# Patient Record
Sex: MALE | Race: BLACK OR AFRICAN AMERICAN | NOT HISPANIC OR LATINO | Employment: UNEMPLOYED | URBAN - METROPOLITAN AREA
[De-identification: names, ages, dates, MRNs, and addresses within clinical notes are randomized per-mention and may not be internally consistent; named-entity substitution may affect disease eponyms.]

---

## 2017-11-02 ENCOUNTER — HOSPITAL ENCOUNTER (OUTPATIENT)
Dept: PULMONOLOGY | Facility: HOSPITAL | Age: 27
Discharge: HOME | End: 2017-11-02
Attending: NURSE PRACTITIONER
Payer: MEDICAID

## 2017-11-02 DIAGNOSIS — Z79.899 HIGH RISK MEDICATION USE: ICD-10-CM

## 2017-11-02 DIAGNOSIS — Z79.899 HIGH RISK MEDICATION USE: Primary | ICD-10-CM

## 2017-11-02 PROCEDURE — 99900035 HC TECH TIME PER 15 MIN (STAT)

## 2017-11-02 PROCEDURE — 93005 ELECTROCARDIOGRAM TRACING: CPT

## 2017-11-02 PROCEDURE — 93010 ELECTROCARDIOGRAM REPORT: CPT | Mod: ,,, | Performed by: INTERNAL MEDICINE

## 2023-04-28 ENCOUNTER — HOSPITAL ENCOUNTER (EMERGENCY)
Facility: HOSPITAL | Age: 33
Discharge: PSYCHIATRIC OR SUBSTANCE USE FACILITY | End: 2023-04-28
Attending: EMERGENCY MEDICINE
Payer: MEDICAID

## 2023-04-28 VITALS
RESPIRATION RATE: 16 BRPM | DIASTOLIC BLOOD PRESSURE: 96 MMHG | TEMPERATURE: 98 F | WEIGHT: 210 LBS | BODY MASS INDEX: 27.71 KG/M2 | OXYGEN SATURATION: 99 % | SYSTOLIC BLOOD PRESSURE: 151 MMHG | HEART RATE: 94 BPM

## 2023-04-28 DIAGNOSIS — Z00.8 MEDICAL CLEARANCE FOR PSYCHIATRIC ADMISSION: ICD-10-CM

## 2023-04-28 DIAGNOSIS — E86.0 DEHYDRATION: ICD-10-CM

## 2023-04-28 DIAGNOSIS — F30.9 MANIA: Primary | ICD-10-CM

## 2023-04-28 LAB
ALBUMIN SERPL BCP-MCNC: 4.3 G/DL (ref 3.5–5.2)
ALP SERPL-CCNC: 40 U/L (ref 55–135)
ALT SERPL W/O P-5'-P-CCNC: 53 U/L (ref 10–44)
AMPHET+METHAMPHET UR QL: NEGATIVE
ANION GAP SERPL CALC-SCNC: 12 MMOL/L (ref 8–16)
AST SERPL-CCNC: 42 U/L (ref 10–40)
BARBITURATES UR QL SCN>200 NG/ML: NEGATIVE
BASOPHILS # BLD AUTO: 0.09 K/UL (ref 0–0.2)
BASOPHILS NFR BLD: 0.6 % (ref 0–1.9)
BENZODIAZ UR QL SCN>200 NG/ML: NEGATIVE
BILIRUB SERPL-MCNC: 1 MG/DL (ref 0.1–1)
BILIRUB UR QL STRIP: NEGATIVE
BUN SERPL-MCNC: 20 MG/DL (ref 6–20)
BZE UR QL SCN: NEGATIVE
CALCIUM SERPL-MCNC: 9.3 MG/DL (ref 8.7–10.5)
CANNABINOIDS UR QL SCN: NEGATIVE
CHLORIDE SERPL-SCNC: 110 MMOL/L (ref 95–110)
CK SERPL-CCNC: 162 U/L (ref 20–200)
CLARITY UR: CLEAR
CO2 SERPL-SCNC: 19 MMOL/L (ref 23–29)
COLOR UR: YELLOW
CREAT SERPL-MCNC: 1.5 MG/DL (ref 0.5–1.4)
CREAT UR-MCNC: 162 MG/DL (ref 23–375)
DIFFERENTIAL METHOD: ABNORMAL
EOSINOPHIL # BLD AUTO: 0 K/UL (ref 0–0.5)
EOSINOPHIL NFR BLD: 0.1 % (ref 0–8)
ERYTHROCYTE [DISTWIDTH] IN BLOOD BY AUTOMATED COUNT: 13.3 % (ref 11.5–14.5)
EST. GFR  (NO RACE VARIABLE): >60 ML/MIN/1.73 M^2
ETHANOL SERPL-MCNC: <10 MG/DL
GLUCOSE SERPL-MCNC: 101 MG/DL (ref 70–110)
GLUCOSE UR QL STRIP: NEGATIVE
HCT VFR BLD AUTO: 48.2 % (ref 40–54)
HGB BLD-MCNC: 16.3 G/DL (ref 14–18)
HGB UR QL STRIP: NEGATIVE
IMM GRANULOCYTES # BLD AUTO: 0.11 K/UL (ref 0–0.04)
IMM GRANULOCYTES NFR BLD AUTO: 0.8 % (ref 0–0.5)
KETONES UR QL STRIP: NEGATIVE
LEUKOCYTE ESTERASE UR QL STRIP: NEGATIVE
LYMPHOCYTES # BLD AUTO: 3 K/UL (ref 1–4.8)
LYMPHOCYTES NFR BLD: 20.6 % (ref 18–48)
MCH RBC QN AUTO: 28.3 PG (ref 27–31)
MCHC RBC AUTO-ENTMCNC: 33.8 G/DL (ref 32–36)
MCV RBC AUTO: 84 FL (ref 82–98)
METHADONE UR QL SCN>300 NG/ML: NEGATIVE
MONOCYTES # BLD AUTO: 2.1 K/UL (ref 0.3–1)
MONOCYTES NFR BLD: 14.6 % (ref 4–15)
NEUTROPHILS # BLD AUTO: 9.1 K/UL (ref 1.8–7.7)
NEUTROPHILS NFR BLD: 63.3 % (ref 38–73)
NITRITE UR QL STRIP: NEGATIVE
NRBC BLD-RTO: 0 /100 WBC
OPIATES UR QL SCN: NEGATIVE
PCP UR QL SCN>25 NG/ML: NEGATIVE
PH UR STRIP: 7 [PH] (ref 5–8)
PLATELET # BLD AUTO: 204 K/UL (ref 150–450)
PMV BLD AUTO: 10.2 FL (ref 9.2–12.9)
POTASSIUM SERPL-SCNC: 4 MMOL/L (ref 3.5–5.1)
PROT SERPL-MCNC: 8 G/DL (ref 6–8.4)
PROT UR QL STRIP: NEGATIVE
RBC # BLD AUTO: 5.75 M/UL (ref 4.6–6.2)
SARS-COV-2 RDRP RESP QL NAA+PROBE: NEGATIVE
SODIUM SERPL-SCNC: 141 MMOL/L (ref 136–145)
SP GR UR STRIP: 1.02 (ref 1–1.03)
TOXICOLOGY INFORMATION: NORMAL
TSH SERPL DL<=0.005 MIU/L-ACNC: 1.21 UIU/ML (ref 0.4–4)
URN SPEC COLLECT METH UR: ABNORMAL
UROBILINOGEN UR STRIP-ACNC: ABNORMAL EU/DL
WBC # BLD AUTO: 14.32 K/UL (ref 3.9–12.7)

## 2023-04-28 PROCEDURE — 99285 EMERGENCY DEPT VISIT HI MDM: CPT | Mod: 25

## 2023-04-28 PROCEDURE — 80307 DRUG TEST PRSMV CHEM ANLYZR: CPT | Performed by: EMERGENCY MEDICINE

## 2023-04-28 PROCEDURE — 96360 HYDRATION IV INFUSION INIT: CPT

## 2023-04-28 PROCEDURE — 80053 COMPREHEN METABOLIC PANEL: CPT | Performed by: EMERGENCY MEDICINE

## 2023-04-28 PROCEDURE — 84443 ASSAY THYROID STIM HORMONE: CPT | Performed by: EMERGENCY MEDICINE

## 2023-04-28 PROCEDURE — U0002 COVID-19 LAB TEST NON-CDC: HCPCS | Performed by: EMERGENCY MEDICINE

## 2023-04-28 PROCEDURE — 85025 COMPLETE CBC W/AUTO DIFF WBC: CPT | Performed by: EMERGENCY MEDICINE

## 2023-04-28 PROCEDURE — 25000003 PHARM REV CODE 250: Performed by: EMERGENCY MEDICINE

## 2023-04-28 PROCEDURE — 82077 ASSAY SPEC XCP UR&BREATH IA: CPT | Performed by: EMERGENCY MEDICINE

## 2023-04-28 PROCEDURE — 82550 ASSAY OF CK (CPK): CPT | Performed by: EMERGENCY MEDICINE

## 2023-04-28 PROCEDURE — 81003 URINALYSIS AUTO W/O SCOPE: CPT | Mod: 59 | Performed by: EMERGENCY MEDICINE

## 2023-04-28 RX ORDER — LORAZEPAM 1 MG/1
2 TABLET ORAL
Status: COMPLETED | OUTPATIENT
Start: 2023-04-28 | End: 2023-04-28

## 2023-04-28 RX ADMIN — SODIUM CHLORIDE 1000 ML: 9 INJECTION, SOLUTION INTRAVENOUS at 05:04

## 2023-04-28 RX ADMIN — LORAZEPAM 2 MG: 1 TABLET ORAL at 05:04

## 2023-04-28 NOTE — ED NOTES
Pt had grey sweat pants, grey sweat shirt, blue shoes and home medications placed in belongings bag, labeled and placed in metal locker.

## 2023-04-28 NOTE — ED PROVIDER NOTES
"SCRIBE #1 NOTE: I, Silvestre Westfall, am scribing for, and in the presence of, Ramon Vail Jr., MD. I have scribed the entire note.       History     Chief Complaint   Patient presents with    Dehydration     Brought in per EMS. Pt was found sitting on bench and was reported to have been sitting there in the sun all day.     Psychiatric Evaluation     Pt brought in per EMS after found sitting on bench all day. EMS reports pt has psych hx of paranoia. Pt denies SI/HI at present. Pt has flight of ideas during triage. Pt also states he was picked up by people in a van yesterday and brought from [facility] and brought to [place].      Review of patient's allergies indicates:  No Known Allergies      History of Present Illness     HPI    4/28/2023, 4:18 PM  History obtained from the patient and EMS      History of Present Illness: Jaqueline Valencia Jr. is a 33 y.o. male patient with a PMHx of paranoia who presents to the Emergency Department for psychiatric evaluation. Per EMS, pt reported to be found outside on a bench in the sun all day. Pt was unresponsive and unable to answer questions. Pt said they recently came from a hospital in [place] but was driven down by the hospital back to . Pt said they have "depressive feelings". Pt is dehydrated. Symptoms are constant and moderate in severity. No mitigating or exacerbating factors reported. Associated sxs includes anxiousness. Patient denies any CP, SOB, SI/HI, N/V, and all other sxs at this time. No prior Tx. Pt does not have family in  and is from [place]. No further complaints or concerns at this time.       Arrival mode: EMS    PCP: KYLAH Pride        Past Medical History:  Past Medical History:   Diagnosis Date    Asthma     Depression     Fatigue     History of psychiatric care     History of psychiatric hospitalization     Psychiatric problem     Psychosis     Rectal bleeding 01/13    Schizoaffective disorder     Self-harming behavior     Suicide " attempt     Therapy        Past Surgical History:  Past Surgical History:   Procedure Laterality Date    DENTAL SURGERY      NASAL SEPTUM SURGERY           Family History:  Family History   Problem Relation Age of Onset    Bipolar disorder Mother     Drug abuse Father     Autism Sister     Suicide Brother     Drug abuse Brother     Depression Brother     Depression Paternal Grandmother     Alcohol abuse Neg Hx     Dementia Neg Hx     Schizophrenia Neg Hx        Social History:  Social History     Tobacco Use    Smoking status: Never    Smokeless tobacco: Never   Substance and Sexual Activity    Alcohol use: No    Drug use: No    Sexual activity: Not on file        Review of Systems     Review of Systems   Constitutional:  Negative for chills and fever.        (+) Dehydrated   HENT:  Negative for congestion and sore throat.    Respiratory:  Negative for cough and shortness of breath.    Cardiovascular:  Negative for chest pain.   Gastrointestinal:  Negative for abdominal pain, nausea and vomiting.   Genitourinary:  Negative for dysuria.   Musculoskeletal:  Negative for back pain.   Skin:  Negative for rash.   Neurological:  Negative for dizziness, weakness and headaches.   Hematological:  Does not bruise/bleed easily.   Psychiatric/Behavioral:  Negative for self-injury and suicidal ideas. The patient is nervous/anxious.         (+) Depressive thoughts   All other systems reviewed and are negative.     Physical Exam     Initial Vitals [04/28/23 1536]   BP Pulse Resp Temp SpO2   138/84 (!) 127 20 99.5 °F (37.5 °C) 99 %      MAP       --          Physical Exam    Nursing Notes and Vital Signs Reviewed.  Constitutional: Patient is in no acute distress. Well-developed and well-nourished.  Head: Atraumatic. Normocephalic.  Eyes:  EOM intact.  No scleral icterus.  ENT: Mucous membranes are moist.  Nares clear   Neck:  Full ROM. No JVD.  Cardiovascular: Regular rate. Regular rhythm No murmurs, rubs, or gallops. Distal pulses  are 2+ and symmetric  Pulmonary/Chest: No respiratory distress. Clear to auscultation bilaterally. No wheezing or rales.  Equal chest wall rise bilaterally  Abdominal: Soft and non-distended.  There is no tenderness.  No rebound, guarding, or rigidity. Good bowel sounds.  Genitourinary: No CVA tenderness.  No suprapubic tenderness  Musculoskeletal: Moves all extremities. No obvious deformities.  5 x 5 strength in all extremities   Skin: Warm and dry.  Neurological:  Alert, awake, and appropriate.  Normal speech.  No acute focal neurological deficits are appreciated.  Two through 12 intact bilaterally.  Psychiatric: Paranoid. Pressured. Impaired insight and judgement.  Patient is very paranoid with pressured speech and flight of ideas.  He is very difficult to redirect.  Patient seems to have some delusions of being brought here by a bus full of 12 people from [place] however we are unable to confirm this.  The patient has no safe place to stay is currently homeless, noncompliant medications and feels that his depression is getting the best of him.  Pec is issued as a danger to self and gravely disabled           ED Course   Procedures  ED Vital Signs:  Vitals:    04/28/23 1536 04/28/23 1721 04/28/23 1832 04/28/23 1909   BP: 138/84 132/86 136/80    Pulse: (!) 127 (!) 124 108    Resp: 20 20 18    Temp: 99.5 °F (37.5 °C) 99.3 °F (37.4 °C) 99 °F (37.2 °C)    TempSrc: Oral Oral Oral    SpO2: 99% 99% 98%    Weight:    95.3 kg (210 lb)       Abnormal Lab Results:  Labs Reviewed   CBC W/ AUTO DIFFERENTIAL - Abnormal; Notable for the following components:       Result Value    WBC 14.32 (*)     Immature Granulocytes 0.8 (*)     Gran # (ANC) 9.1 (*)     Immature Grans (Abs) 0.11 (*)     Mono # 2.1 (*)     All other components within normal limits   COMPREHENSIVE METABOLIC PANEL - Abnormal; Notable for the following components:    CO2 19 (*)     Creatinine 1.5 (*)     Alkaline Phosphatase 40 (*)     AST 42 (*)     ALT 53 (*)      All other components within normal limits   URINALYSIS, REFLEX TO URINE CULTURE - Abnormal; Notable for the following components:    Urobilinogen, UA 2.0-3.0 (*)     All other components within normal limits    Narrative:     Specimen Source->Urine   TSH   DRUG SCREEN PANEL, URINE EMERGENCY    Narrative:     Specimen Source->Urine   ALCOHOL,MEDICAL (ETHANOL)   SARS-COV-2 RNA AMPLIFICATION, QUAL   CK        All Lab Results:  Results for orders placed or performed during the hospital encounter of 04/28/23   CBC auto differential   Result Value Ref Range    WBC 14.32 (H) 3.90 - 12.70 K/uL    RBC 5.75 4.60 - 6.20 M/uL    Hemoglobin 16.3 14.0 - 18.0 g/dL    Hematocrit 48.2 40.0 - 54.0 %    MCV 84 82 - 98 fL    MCH 28.3 27.0 - 31.0 pg    MCHC 33.8 32.0 - 36.0 g/dL    RDW 13.3 11.5 - 14.5 %    Platelets 204 150 - 450 K/uL    MPV 10.2 9.2 - 12.9 fL    Immature Granulocytes 0.8 (H) 0.0 - 0.5 %    Gran # (ANC) 9.1 (H) 1.8 - 7.7 K/uL    Immature Grans (Abs) 0.11 (H) 0.00 - 0.04 K/uL    Lymph # 3.0 1.0 - 4.8 K/uL    Mono # 2.1 (H) 0.3 - 1.0 K/uL    Eos # 0.0 0.0 - 0.5 K/uL    Baso # 0.09 0.00 - 0.20 K/uL    nRBC 0 0 /100 WBC    Gran % 63.3 38.0 - 73.0 %    Lymph % 20.6 18.0 - 48.0 %    Mono % 14.6 4.0 - 15.0 %    Eosinophil % 0.1 0.0 - 8.0 %    Basophil % 0.6 0.0 - 1.9 %    Differential Method Automated    Comprehensive metabolic panel   Result Value Ref Range    Sodium 141 136 - 145 mmol/L    Potassium 4.0 3.5 - 5.1 mmol/L    Chloride 110 95 - 110 mmol/L    CO2 19 (L) 23 - 29 mmol/L    Glucose 101 70 - 110 mg/dL    BUN 20 6 - 20 mg/dL    Creatinine 1.5 (H) 0.5 - 1.4 mg/dL    Calcium 9.3 8.7 - 10.5 mg/dL    Total Protein 8.0 6.0 - 8.4 g/dL    Albumin 4.3 3.5 - 5.2 g/dL    Total Bilirubin 1.0 0.1 - 1.0 mg/dL    Alkaline Phosphatase 40 (L) 55 - 135 U/L    AST 42 (H) 10 - 40 U/L    ALT 53 (H) 10 - 44 U/L    Anion Gap 12 8 - 16 mmol/L    eGFR >60 >60 mL/min/1.73 m^2   TSH   Result Value Ref Range    TSH 1.207 0.400 - 4.000  uIU/mL   Urinalysis, Reflex to Urine Culture Urine, Clean Catch    Specimen: Urine   Result Value Ref Range    Specimen UA Urine, Clean Catch     Color, UA Yellow Yellow, Straw, Chandrika    Appearance, UA Clear Clear    pH, UA 7.0 5.0 - 8.0    Specific Gravity, UA 1.020 1.005 - 1.030    Protein, UA Negative Negative    Glucose, UA Negative Negative    Ketones, UA Negative Negative    Bilirubin (UA) Negative Negative    Occult Blood UA Negative Negative    Nitrite, UA Negative Negative    Urobilinogen, UA 2.0-3.0 (A) <2.0 EU/dL    Leukocytes, UA Negative Negative   Drug screen panel, emergency   Result Value Ref Range    Benzodiazepines Negative Negative    Methadone metabolites Negative Negative    Cocaine (Metab.) Negative Negative    Opiate Scrn, Ur Negative Negative    Barbiturate Screen, Ur Negative Negative    Amphetamine Screen, Ur Negative Negative    THC Negative Negative    Phencyclidine Negative Negative    Creatinine, Urine 162.0 23.0 - 375.0 mg/dL    Toxicology Information SEE COMMENT    Ethanol   Result Value Ref Range    Alcohol, Serum <10 <10 mg/dL   COVID-19 Rapid Screening   Result Value Ref Range    SARS-CoV-2 RNA, Amplification, Qual Negative Negative   CPK   Result Value Ref Range     20 - 200 U/L        Imaging Results:  Imaging Results    None                   The Emergency Provider reviewed the vital signs and test results, which are outlined above.     ED Discussion     4:46 PM: The PEC hold has been issued by Dr. Vail at this time for 16:46.     6:44 PM: Pt has been medically cleared by Dr. Vail at this time. Reassessed pt at this time. Pt is resting comfortably and appears in no acute distress. There are no psychiatric services offered at this facility. D/w pt all pertinent ED information and plan to transfer to psychiatric facility for psychiatric treatment. Pt verbalizes understanding. Patient being transferred by \[facility]\"" for ongoing personal protection en route. Pt has been made  aware of all risks and benefits associated with transfer, including but not limited to death, MVC, loss of vital signs, and/or permanent disability. Benefits include ability to be treated at an inpatient psychiatric facility. Pt will be transported by personnel trained in CPR and CPI. Patient understands that there could be unforeseen motor vehicle accidents, inclement weather, or loss of vital signs that could result in potential death or permanent disability. All questions and complaints have been addressed at this time. Pt condition is stable at this time and is clear to transfer to psychiatric facility at this time.           Medical Decision Making:   Initial Assessment:   Patient was evaluated history physical examination.  Patient notes that he is very depressed as pressured sitting in the sun all day long not care for himself.  Notes he is feeling some ADLs he is very paranoid and depressed with flight of ideas and floridly manic pec issued  Differential Diagnosis:   Psychosis, suzette, depression, substance abuse  Clinical Tests:   Lab Tests: Ordered and Reviewed  ED Management:  Patient was evaluated history physical examination.  Patient has risk factors of homelessness with no one to care for him.  He is from out of town.  Was sitting in the sun all day and not responding to questions when initially picked up.  He is now floridly manic with pressured speech and flight of ideas difficult to redirect.  Does note some depression and concerns over his current medications.  He has impaired insight and judgment as such a pec was issued.  I subsequently ordered all laboratory studies.  Has negative urine negative UDS negative COVID negative TSH normal CPK, mild elevation creatinine 1.5 undetectable alcohol 14,000 white count.  Patient was treated with IV fluids as well as Ativan for his anxiety.  His pulses come down patient is feeling much better.  Differential diagnosis included rhabdo however this is been  ruled out.  Patient is now medically cleared for psychiatric placement.         ED Medication(s):  Medications   sodium chloride 0.9% bolus 1,000 mL 1,000 mL (0 mLs Intravenous Stopped 4/28/23 1805)   LORazepam tablet 2 mg (2 mg Oral Given 4/28/23 1714)       New Prescriptions    No medications on file               Scribe Attestation:   Scribe #1: I performed the above scribed service and the documentation accurately describes the services I performed. I attest to the accuracy of the note.     Attending:   Physician Attestation Statement for Scribe #1: I, Ramon Vail Jr., MD, personally performed the services described in this documentation, as scribed by Silvestre Westfall, in my presence, and it is both accurate and complete.           Clinical Impression       ICD-10-CM ICD-9-CM   1. Cassie  F30.9 296.00   2. Dehydration  E86.0 276.51   3. Medical clearance for psychiatric admission  Z00.8 V70.8       Disposition:   Disposition: Transferred  Condition: Fair      Ramon Vail Jr., MD  04/28/23 1920